# Patient Record
Sex: MALE | Race: OTHER | URBAN - METROPOLITAN AREA
[De-identification: names, ages, dates, MRNs, and addresses within clinical notes are randomized per-mention and may not be internally consistent; named-entity substitution may affect disease eponyms.]

---

## 2024-10-18 ENCOUNTER — OFFICE VISIT (OUTPATIENT)
Dept: DENTISTRY | Facility: CLINIC | Age: 29
End: 2024-10-18

## 2024-10-18 VITALS — TEMPERATURE: 98 F

## 2024-10-18 DIAGNOSIS — M26.31 TOOTH CROWDING: Primary | ICD-10-CM

## 2024-10-18 PROCEDURE — WIS8000 ORTHO SCREENING

## 2024-10-18 PROCEDURE — D0330 PANORAMIC RADIOGRAPHIC IMAGE: HCPCS

## 2024-10-21 NOTE — DENTAL PROCEDURE DETAILS
"Orthodontic Consultation    Brittanie Malone 29 y.o. male presents with self and wife to [facility] for ortho consult.  PMH reviewed, no changes, ASA I. Significant medical history: N/A. Significant allergies: N/A. Significant medications: N/A.    Chief concern:  \"My front teeth are crowded\"    Stage of Development:   Permanent  Loose primary teeth? No  Partially Erupted Teeth? No    Mahesh-Posterior Relationships:  Molar Class: Left: Class I Right: Class I  Canine Class: Left: Class II (half unit) Right: Class I  Overjet: Severe (6+ mm)  Anterior Crossbite: None    Transverse Relationships:  Posterior Crossbite: None  Upper Midline (Measured to Philtrum): Centered  Lower Midline: Left (1 mm)    Vertical Relationships:  Overbite: Deep (60%)    Arch Perimeter:  Upper Arch: Crowding   Severe (8+mm)  Lower Arch: Crowding   Severe (8+mm)    Panoramic Film Evaluation:  Missing teeth: #3 ; #1 is serving as the second upper right molar.  Impacted teeth: #16,17,32  Ectopically developing teeth: None  Overall Eruption Pattern: normal  Other Radiographic findings: No abnormalities detected    Facial Profile:  Convex    Oral Habits:  None    Oral Hygiene:  Good    Other Findings:  #8 buccally tilted, rotated #22 and 27 due to crowding    Most Significant Problems:  Severe crowding on both upper and lower arches.    Recommendation:  Refer to outside orthodontist; Gave copy of PAN and referral to [facility].    Patient dismissed ambulatory and alert.    NV: Patient already has general dentist at [facility].    Attending: Dr. Magana examined pt.   "